# Patient Record
Sex: FEMALE | Race: WHITE
[De-identification: names, ages, dates, MRNs, and addresses within clinical notes are randomized per-mention and may not be internally consistent; named-entity substitution may affect disease eponyms.]

---

## 2022-09-20 ENCOUNTER — HOSPITAL ENCOUNTER (OUTPATIENT)
Dept: HOSPITAL 76 - LAB | Age: 27
Discharge: HOME | End: 2022-09-20
Attending: ADVANCED PRACTICE MIDWIFE
Payer: COMMERCIAL

## 2022-09-20 DIAGNOSIS — R33.8: Primary | ICD-10-CM

## 2022-09-20 PROCEDURE — 36415 COLL VENOUS BLD VENIPUNCTURE: CPT

## 2022-09-20 PROCEDURE — 85049 AUTOMATED PLATELET COUNT: CPT

## 2022-10-15 ENCOUNTER — HOSPITAL ENCOUNTER (OUTPATIENT)
Dept: HOSPITAL 76 - WFO | Age: 27
Discharge: HOME | End: 2022-10-15
Attending: ADVANCED PRACTICE MIDWIFE
Payer: COMMERCIAL

## 2022-10-15 VITALS — DIASTOLIC BLOOD PRESSURE: 69 MMHG | SYSTOLIC BLOOD PRESSURE: 109 MMHG

## 2022-10-15 DIAGNOSIS — O99.113: ICD-10-CM

## 2022-10-15 DIAGNOSIS — O99.891: Primary | ICD-10-CM

## 2022-10-15 DIAGNOSIS — D69.6: ICD-10-CM

## 2022-10-15 DIAGNOSIS — Z3A.36: ICD-10-CM

## 2022-10-15 DIAGNOSIS — R10.9: ICD-10-CM

## 2022-10-15 LAB
ALBUMIN DIAFP-MCNC: 3.1 G/DL (ref 3.2–5.5)
ALBUMIN/GLOB SERPL: 1 {RATIO} (ref 1–2.2)
ALP SERPL-CCNC: 107 IU/L (ref 42–121)
ALT SERPL W P-5'-P-CCNC: 19 IU/L (ref 10–60)
ANION GAP SERPL CALCULATED.4IONS-SCNC: 7 MMOL/L (ref 6–13)
AST SERPL W P-5'-P-CCNC: 18 IU/L (ref 10–42)
BASOPHILS NFR BLD AUTO: 0 10^3/UL (ref 0–0.1)
BASOPHILS NFR BLD AUTO: 0.1 %
BILIRUB BLD-MCNC: 0.6 MG/DL (ref 0.2–1)
BUN SERPL-MCNC: 8 MG/DL (ref 6–20)
CALCIUM UR-MCNC: 9 MG/DL (ref 8.5–10.3)
CHLORIDE SERPL-SCNC: 105 MMOL/L (ref 101–111)
CLARITY UR REFRACT.AUTO: CLEAR
CO2 SERPL-SCNC: 25 MMOL/L (ref 21–32)
CREAT SERPLBLD-SCNC: 0.7 MG/DL (ref 0.4–1)
EOSINOPHIL # BLD AUTO: 0 10^3/UL (ref 0–0.7)
EOSINOPHIL NFR BLD AUTO: 0.5 %
ERYTHROCYTE [DISTWIDTH] IN BLOOD BY AUTOMATED COUNT: 12.7 % (ref 12–15)
GFRSERPLBLD MDRD-ARVRAT: 102 ML/MIN/{1.73_M2} (ref 89–?)
GLOBULIN SER-MCNC: 3.2 G/DL (ref 2.1–4.2)
GLUCOSE SERPL-MCNC: 112 MG/DL (ref 70–100)
GLUCOSE UR QL STRIP.AUTO: NEGATIVE MG/DL
HCT VFR BLD AUTO: 39.6 % (ref 37–47)
HGB UR QL STRIP: 13 G/DL (ref 12–16)
KETONES UR QL STRIP.AUTO: NEGATIVE MG/DL
LYMPHOCYTES # SPEC AUTO: 1 10^3/UL (ref 1.5–3.5)
LYMPHOCYTES NFR BLD AUTO: 11 %
MCH RBC QN AUTO: 30.7 PG (ref 27–31)
MCHC RBC AUTO-ENTMCNC: 32.8 G/DL (ref 32–36)
MCV RBC AUTO: 93.4 FL (ref 81–99)
MONOCYTES # BLD AUTO: 0.5 10^3/UL (ref 0–1)
MONOCYTES NFR BLD AUTO: 6.1 %
NEUTROPHILS # BLD AUTO: 7.2 10^3/UL (ref 1.5–6.6)
NEUTROPHILS # SNV AUTO: 8.9 X10^3/UL (ref 4.8–10.8)
NEUTROPHILS NFR BLD AUTO: 81.7 %
NITRITE UR QL STRIP.AUTO: NEGATIVE
NRBC # BLD AUTO: 0 /100WBC
NRBC # BLD AUTO: 0 X10^3/UL
PDW BLD AUTO: 12.6 FL (ref 7.9–10.8)
PH UR STRIP.AUTO: 7 PH (ref 5–7.5)
PLATELET # BLD: 123 10^3/UL (ref 130–450)
POTASSIUM SERPL-SCNC: 3.7 MMOL/L (ref 3.5–5)
PROT SPEC-MCNC: 6.3 G/DL (ref 6.7–8.2)
PROT UR STRIP.AUTO-MCNC: NEGATIVE MG/DL
RBC # UR STRIP.AUTO: NEGATIVE /UL
RBC # URNS HPF: (no result) /HPF (ref 0–5)
RBC MAR: 4.24 10^6/UL (ref 4.2–5.4)
SODIUM SERPLBLD-SCNC: 137 MMOL/L (ref 135–145)
SP GR UR STRIP.AUTO: 1.01 (ref 1–1.03)
SQUAMOUS URNS QL MICRO: (no result)
UROBILINOGEN UR QL STRIP.AUTO: (no result) E.U./DL
UROBILINOGEN UR STRIP.AUTO-MCNC: NEGATIVE MG/DL
WBC # UR MANUAL: (no result) /HPF (ref 0–5)

## 2022-10-15 PROCEDURE — 81001 URINALYSIS AUTO W/SCOPE: CPT

## 2022-10-15 PROCEDURE — 85025 COMPLETE CBC W/AUTO DIFF WBC: CPT

## 2022-10-15 PROCEDURE — 80053 COMPREHEN METABOLIC PANEL: CPT

## 2022-10-15 PROCEDURE — 36415 COLL VENOUS BLD VENIPUNCTURE: CPT

## 2022-10-15 PROCEDURE — 99215 OFFICE O/P EST HI 40 MIN: CPT

## 2022-10-15 PROCEDURE — 59025 FETAL NON-STRESS TEST: CPT

## 2022-10-15 NOTE — PROVIDER PROGRESS NOTE
- HPI


Chief Complaint: Pain, non-labor


Current Pregnancy: 


Vital Signs











Temperature  36.6 C   10/15/22 15:20


 


Heart Rate  93   10/15/22 15:20


 


Respiratory Rate  17   10/15/22 15:20


 


Blood Pressure  109/69   10/15/22 15:20


 


O2 Saturation  100   10/15/22 15:20




















Temperature  36.6 C   10/15/22 15:21


 


Heart Rate  93   10/15/22 15:20


 


Respiratory Rate  17   10/15/22 15:20


 


Blood Pressure  109/69   10/15/22 15:20


 


O2 Saturation  100   10/15/22 15:20


 


If not protocol: Oxygen Flow, liters/minute      














- Procedures


OB Procedure Performed: NST


Diagnosis/Indication for NST:  labor





- Plan


Plan: 





HPI: Tawana is a 26yo  @ 36.3wks gestation who presents to [facility] with 

concerns for upper abdominal pain that is sharp, wave-like and has been present 

intermittently since early this morning at 0330am. She denies vaginal bleeding 

or leakage of fluid. She reports +FM. She states she can feel contractions 

occasionally that just seem like [place] contractions and the pain she is 

experiencing currently is not the same. She rates the pain 8/10. She has taken 

TUMS, eaten fresh pineapple, rested, taken a warm bath, and was able to get a 

little relief for a couple of hours this morning but then it came back today 

around 1130 and seemed more severe. She denies fever, body aches, or chills. She

denies constipation or diarrhea. She reports mild headache but states she gets 

them occasionally because she does not wear her glasses when she should. She 

states she feels adequately hydrated and she denies urinary symptoms.





NST performed 10/15/2022


NST read 10/15/2022


NST reactive. FHR baseline 130, moderate variability, + accels, no decels


One contraction appreciated via tocometry which pt appreciates as mild. Soft 

resting tone.





SVE 1/50/high, vertex.





CBC WNL ( secondary to gestational thrombocytopenia - stable from last 

draw 2 weeks ago)


CMP WNL


 Vital signs WNL.





Normocephalic, atraumatic, heart RRR w/o M/G/R, lungs CTAB, abdomen gravid soft 

and nontender, trace edema to LE's bilaterally. Mood is good.





Assessment:


26yo  @ 36.3wks gestation


Gestational thombocytopenia


Mid upper abdominal pain -  labor and pre-eclamspia ruled out. Suspect 

gas pains.





Plan:


Pt released home with precautions.


Recommended simethicone PRN pain and continuation of TUMS PRN.


Reviewed warning s/sx and when to present.


Pt verbalized understanding and agrees to above plan. She denies further 

questions or concerns at this time.





FINAL DIAGNOSIS:


Abdominal pain

## 2022-11-05 ENCOUNTER — HOSPITAL ENCOUNTER (INPATIENT)
Dept: HOSPITAL 76 - WFO | Age: 27
LOS: 2 days | Discharge: HOME | End: 2022-11-07
Attending: ADVANCED PRACTICE MIDWIFE | Admitting: ADVANCED PRACTICE MIDWIFE
Payer: COMMERCIAL

## 2022-11-05 DIAGNOSIS — D69.6: ICD-10-CM

## 2022-11-05 DIAGNOSIS — Z3A.39: ICD-10-CM

## 2022-11-05 LAB
BASOPHILS NFR BLD AUTO: 0 10^3/UL (ref 0–0.1)
BASOPHILS NFR BLD AUTO: 0.2 %
EOSINOPHIL # BLD AUTO: 0 10^3/UL (ref 0–0.7)
EOSINOPHIL NFR BLD AUTO: 0.2 %
ERYTHROCYTE [DISTWIDTH] IN BLOOD BY AUTOMATED COUNT: 12.8 % (ref 12–15)
HCT VFR BLD AUTO: 38.9 % (ref 37–47)
HGB UR QL STRIP: 13.1 G/DL (ref 12–16)
IGF BP1 VAG QL: NEGATIVE
LYMPHOCYTES # SPEC AUTO: 0.9 10^3/UL (ref 1.5–3.5)
LYMPHOCYTES NFR BLD AUTO: 6.9 %
MCH RBC QN AUTO: 31.4 PG (ref 27–31)
MCHC RBC AUTO-ENTMCNC: 33.7 G/DL (ref 32–36)
MCV RBC AUTO: 93.3 FL (ref 81–99)
MONOCYTES # BLD AUTO: 0.7 10^3/UL (ref 0–1)
MONOCYTES NFR BLD AUTO: 5 %
NEUTROPHILS # BLD AUTO: 11.3 10^3/UL (ref 1.5–6.6)
NEUTROPHILS # SNV AUTO: 12.9 X10^3/UL (ref 4.8–10.8)
NEUTROPHILS NFR BLD AUTO: 87.2 %
NRBC # BLD AUTO: 0 /100WBC
NRBC # BLD AUTO: 0 X10^3/UL
PDW BLD AUTO: 13.5 FL (ref 7.9–10.8)
PLATELET # BLD: 140 10^3/UL (ref 130–450)
RBC MAR: 4.17 10^6/UL (ref 4.2–5.4)

## 2022-11-05 PROCEDURE — 86901 BLOOD TYPING SEROLOGIC RH(D): CPT

## 2022-11-05 PROCEDURE — 86900 BLOOD TYPING SEROLOGIC ABO: CPT

## 2022-11-05 PROCEDURE — 85025 COMPLETE CBC W/AUTO DIFF WBC: CPT

## 2022-11-05 PROCEDURE — 99215 OFFICE O/P EST HI 40 MIN: CPT

## 2022-11-05 PROCEDURE — 84112 EVAL AMNIOTIC FLUID PROTEIN: CPT

## 2022-11-05 PROCEDURE — 86850 RBC ANTIBODY SCREEN: CPT

## 2022-11-05 PROCEDURE — 99213 OFFICE O/P EST LOW 20 MIN: CPT

## 2022-11-05 PROCEDURE — 10907ZC DRAINAGE OF AMNIOTIC FLUID, THERAPEUTIC FROM PRODUCTS OF CONCEPTION, VIA NATURAL OR ARTIFICIAL OPENING: ICD-10-PCS | Performed by: ADVANCED PRACTICE MIDWIFE

## 2022-11-05 RX ADMIN — SODIUM CHLORIDE SCH MLS/HR: 900 INJECTION INTRAVENOUS at 17:55

## 2022-11-05 RX ADMIN — SODIUM CHLORIDE SCH MLS/HR: 900 INJECTION INTRAVENOUS at 22:05

## 2022-11-05 NOTE — ANESTHESIA PROCEDURE NOTE
Anesthesia Epidural Template





- Patient Report


Patient Reports: positive: Pain controlled





- Plan


Plan: positive: Continue current management

## 2022-11-05 NOTE — ANESTHESIA
Pre-Anesthesia VS, & Labs





- Diagnosis





active labor





- Procedure





labor epidural


Vital Signs: 





                                        











Temp Pulse Resp BP Pulse Ox O2 Flow Rate


 


 36.8 C   87   18   124/81 H      


 


 22 17:04  22 17:04  22 12:38  22 12:38      











Height: 5 ft 4 in


Weight (kg): 71.668 kg


Body Mass Index: 27.1


BMI Classification: Overweight





- NPO


Other (eating and drinking until epidural)





- Pregnancy


Is Patient Pregnant?: Yes





- Lab Results


Current Lab Results: 





Laboratory Tests





22 15:21: Blood Type Recheck A POSITIVE


22 12:50: WBC 12.9 H, RBC 4.17 L, Hgb 13.1, Hct 38.9, MCV 93.3, MCH 31.4 H

, MCHC 33.7, RDW 12.8, Plt Count 140, MPV 13.5 H, Neut # (Auto) 11.3 H, Lymph # 

(Auto) 0.9 L, Mono # (Auto) 0.7, Eos # (Auto) 0.0, Baso # (Auto) 0.0, Absolute 

Nucleated RBC 0.00, Nucleated RBC % 0.0


22 12:50: Blood Type A POSITIVE, Antibody Screen NEGATIVE








Fish Bones: 


                                 22 12:50








Home Medications and Allergies





Active Medications





Carboprost Tromethamine (Carboprost Tromethamine 250 Mcg/Ml Amp)  250 mcg IM 

.ONCE PRN


   PRN Reason: Hemorrhage


Fentanyl (Fentanyl 100 Mcg/2 Ml Vial)  50 mcg IVP Q1H PRN


   PRN Reason: Severe Pain (score 7-10)


Hydralazine HCl (Hydralazine Inj 20 Mg/Ml Vial)  5 - 10 mg IVP Q20M PRN; 

Protocol


   PRN Reason: SBP> or= 160 OR DBP> or= 110


Hydralazine HCl (Hydralazine Inj 20 Mg/Ml Vial)  10 mg IVP .ONCE PRN; Protocol


   PRN Reason: SBP> or= 160 OR DBP> or= 110


Oxytocin/Sodium Chloride (Pitocin/Sodium Chloride)  500 mls @ 999 mls/hr IV PRN 

PRN; Protocol


   PRN Reason: POST-PARTUM HEMORR PREVENTION


Tranexamic Acid (Tranexamic 1,000 Mg/100ml-Nacl)  1,000 mg in 100 mls @ 600 

mls/hr IV Q30M PRN


   PRN Reason: EBL >1200mL and within 3hr


Ampicillin Sodium 1 gm/ Sodium (Chloride)  100 mls @ 200 mls/hr IV Q4H [facility]


   Last Infusion: 22 18:25 Dose:  Infused


   


Lactated Ringer's (Lr)  1,000 mls @ 125 mls/hr IV .Q8H [facility]


   Last Admin: 22 20:00 Dose:  125 mls/hr


   


Labetalol HCl (Labetalol 20 Mg/4 Ml Syringe)  20 - 80 mg IVP Q10M PRN; Protocol


   PRN Reason: SBP> or= 160 OR DBP> or= 110


Labetalol HCl (Labetalol 20 Mg/4 Ml Syringe)  20 mg IVP .ONCE PRN; Protocol


   PRN Reason: SBP> or= 160 OR DBP> or= 110


Labetalol HCl (Labetalol 20 Mg/4 Ml Syringe)  20 - 40 mg IVP Q10M PRN; Protocol


   PRN Reason: SBP> or= 160 OR DBP> or= 110


Lidocaine HCl (Lidocaine 1% 20 Ml Mdv)  20 ml ID .ONCE PRN


   PRN Reason: PERINEAL REPAIR


   Stop: 22 13:18


Methylergonovine Maleate (Methylergonovine 0.2 Mg/Ml Vial)  0.2 mg IM .ONCE PRN


   PRN Reason: Hemorrhage


Misoprostol (Misoprostol 200 Mcg Tablet)  600 mcg BC .ONCE PRN


   PRN Reason: Hemorrhage


Misoprostol (Misoprostol 200 Mcg Tablet)  800 mcg TX .ONCE PRN


   PRN Reason: Hemorrhage


Nifedipine (Nifedipine 10 Mg Capsule)  10 - 20 mg PO Q20M PRN; Protocol


   PRN Reason: SBP> or= 160 OR DBP> or= 110


Oxytocin (Oxytocin 10 Unit/Ml Vial)  10 unit IM .ONCE PRN


   PRN Reason: Step One if no IV access.


Sodium Chloride (Sodium Chloride Flush 0.9% 10 Ml Syringe)  10 ml IVP PRN PRN


   PRN Reason: AS NEEDED PER PROVIDER ORDERS


Sodium Chloride (Sodium Chloride Flush 0.9% 10 Ml Syringe)  10 ml IVP Q8H [facility]


   Last Admin: 22 17:56 Dose:  10 ml


   


Terbutaline Sulfate (Terbutaline 1 Mg/Ml Vial)  0.25 mg SUBQ .ONCE PRN


   PRN Reason: Tachystole








Allergies/Adverse Reactions: 


                                    Allergies











Allergy/AdvReac Type Severity Reaction Status Date / Time


 


Pertussis Vaccines AdvReac  Anaphylaxis Verified 22 12:57














Anes History & Medical History





- Anesthetic History


Anesthesia Complications: reports: No previous complications


Family history of Anesthesia Complications: Denies


Family history of Malignant Hyperthermia: Denies





- Medical History


Cardiovascular: reports: None (gestational thrombocytopenia)


Smoking Status: Never smoker





- Obstetrical History


: 2


Parity: 1


Prenatal Events: reports: None


Pregnancy Complications: reports: None





Exam


General: Alert, Oriented x3, Cooperative


Dental: WNL


Mouth Opening: 3 Fingerbreadth


Neck Mobility: Normal


Mallampati classification: I


Thyromental Distance: 4-6 cm


Respiratory: Lungs clear


Cardiovascular: Regular rate





Plan


Anesthesia Type: Epidural


Consent for Procedure(s) Verified and Reviewed: Yes


Code Status: Attempt Resuscitation


ASA classification: 2-Mild systemic disease


Is this case an emergency?: No

## 2022-11-05 NOTE — PROCEDURE REPORT
- HPI


Diagnosis/Indication for NST: Other





- NST Procedure


NST Procedure





Start Time                       15:17


Stop Time                        16:15











- Results and Plan


Plan: 





Tawana presents with c/o contractions. She reports contractions have increased 

in frequency and intensity since early this morning. She lost her mucus plug 

this morning. She denies vaginal bleeding but did experience some pink discharge

when she went to the bathroom last. She does report some leakage of clear fluid 

this morning and again just now after using the bathroom. She reports +FM. She 

states the majority of her contractions are in her back but does occasionally 

feel them in her front as well.





NST performed 11/05/2022


NST read 11/05/2022


NST reactive. FHR baseline 140s, moderate variability, + accels, no decels


Contractions palpate moderate every 5-8 minutes with soft resting tone





SVE 3/90/-1, posterior and vertex.


ROM plus - negative





Pt admitted for expectant management.


She is GBS positive and had difficulty with transportation secondary to poor 

road conditions.


Pt verbalized understanding and agrees to above plan. She denies further 

questions or concerns at this time.
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
## 2022-11-05 NOTE — PROVIDER PROGRESS NOTE
Labor Progress Note





- Uterine Monitoring


Uterine Monitoring Mode: positive: External toco


Contraction Frequency (min/apart): 5-7


Contraction Intensity: positive: Moderate


Uterine Resting Tone: positive: Soft





- Fetal Monitoring


Fetal Monitor Mode: positive: External ultrasound


Fetal Heart Rate Baseline: 130


Fetal Heart Rate Variability: positive: Moderate (6-25 bmp)


Fetal Accelerations: positive: Present, 15x15


Fetal Decelerations: positive: None


Fetal Strip Review: positive: Category I





- Vaginal Exam


Dilation (in cm): 4


Effacement (%): 90


Station: -1


Cervical Position: Posterior





- Labor Progress Note


Labor Progress Note/Additional Text: 





S: Breathing through contractions. Feeling the contractions are getting longer 

but remaining about the same in intensity. She is coping well and her mom is 

supportive at the bedside.


O: FHR baseline 130s, moderate variability, + accels, no decels


Contractions palpate moderate every 5-7 minutes with soft resting tone


SVE 4/90/-1, posterior. Vertex.


AROM moderate amount of clear fluid


A: 28yo  @ 39.3wks gestation by LMP c/w 8.6wk U/S


Active labor


GBS positive


FHR Category I


P: Continue expectant management.


Intermittent fetal heart rate auscultation.


Jacuzzi PRN. Nitrous oxide PRN.


Epidural per maternal request.


Anticipate .

## 2022-11-06 RX ADMIN — ACETAMINOPHEN SCH MG: 500 TABLET ORAL at 21:07

## 2022-11-06 RX ADMIN — IBUPROFEN SCH MG: 800 TABLET, FILM COATED ORAL at 14:03

## 2022-11-06 RX ADMIN — DOCUSATE SODIUM SCH MG: 100 CAPSULE, LIQUID FILLED ORAL at 21:07

## 2022-11-06 RX ADMIN — IBUPROFEN SCH MG: 800 TABLET, FILM COATED ORAL at 21:07

## 2022-11-06 RX ADMIN — ACETAMINOPHEN SCH MG: 500 TABLET ORAL at 16:55

## 2022-11-06 RX ADMIN — IBUPROFEN SCH MG: 800 TABLET, FILM COATED ORAL at 08:01

## 2022-11-06 RX ADMIN — ACETAMINOPHEN SCH MG: 500 TABLET ORAL at 01:03

## 2022-11-06 RX ADMIN — ACETAMINOPHEN SCH MG: 500 TABLET ORAL at 09:13

## 2022-11-06 RX ADMIN — IBUPROFEN SCH MG: 800 TABLET, FILM COATED ORAL at 01:03

## 2022-11-06 NOTE — DELIVERY NOTE
Delivery Note





- Labor


Labor: positive: Augmented by ARM





- Infant Delivery Method


Infant Delivery Method: positive: Spontaneous vaginal delivery





- Birth Presentation


Birth Presentation: positive: Vertex, SAMINA - right occiput anterior





- Nuchal Cord


Nuchal Cord: positive: None





- Amniotic Fluid Description


Amniotic Fluid Description: positive: Clear





- Episiotomy Type


Episiotomy Type: positive: None





- Laceration


Laceration: positive: None





- Delivery Outcome


Delivery Outcome: positive: Livebirth





- 


[place]: positive: Placed in direct skin contact with mother, Stimulated, 

Warmed, [place] used


 sex: positive: Female





- Cord


Cord: positive: 3 vessels





- Placenta


Placenta: positive: Intact, Spontaneous





- Estimated Blood Loss


Estimated Blood Loss (in cc): 300





- Post Delivery Events


Post Delivery Events: positive: No post delivery events





- Delivery Comments (Free Text/Narrative)


Delivery Comments (Free Text/Narrative): 





Labor: This 26yo  @ 39.4wks gestation by LMP c/w first trimester 

ultrasound presented on 2022 with c/o contractions. Upon arrival she was 

noted to be 3/90/-1, posterior and vertex with intact membranes. She was found 

to contraction every 5-8 minutes with soft resting tone. She was admitted for 

expectant management. She was augmented with AROM at 1830 on 2022 for a 

moderate amount of clear fluid. Epidural placed per maternal request. She 

received adequate GBS prophylaxis and is s/p 3 doses of ampicillin per protocol.

She progressed spontaneously to c/c/+2 and pushing at 2358.


Birth: Normal  of viable male infant on 2022 @ 0005. No nuchal cord. 

Apgars were 8/9 at 1 and 5 minutes respectively.  stimulated, dried, and 

placed skin to skin. The umbilical cord was allowed to stop pulsating at which 

time it was doubly clamped by CNM and cut by the patient. Pitocin initiated for 

hemostatis. Gentle cord traction and fundal massage applied for active 

management of the third stage. Placenta delivered spontaneously and intact at 

0011. 3VC. Cord blood was obtained. EBL 300mL.


Fourth stage: Uterine fundus firm and there is no excessive bleeding. The 

perineum, cervix and vagina were inspected and noted to be intact. Breastfeeding

initiated. Family bonding well. Both mother and baby were left in stable 

condition.

## 2022-11-07 VITALS — SYSTOLIC BLOOD PRESSURE: 121 MMHG | DIASTOLIC BLOOD PRESSURE: 69 MMHG

## 2022-11-07 RX ADMIN — IBUPROFEN SCH MG: 800 TABLET, FILM COATED ORAL at 02:57

## 2022-11-07 RX ADMIN — ACETAMINOPHEN SCH MG: 500 TABLET ORAL at 13:02

## 2022-11-07 RX ADMIN — ACETAMINOPHEN SCH MG: 500 TABLET ORAL at 05:24

## 2022-11-07 RX ADMIN — DOCUSATE SODIUM SCH MG: 100 CAPSULE, LIQUID FILLED ORAL at 08:09

## 2022-11-07 RX ADMIN — IBUPROFEN SCH MG: 800 TABLET, FILM COATED ORAL at 08:09

## 2022-11-07 NOTE — DISCHARGE SUMMARY
Discharge Summary


Condition at Discharge: Good


Discharge Disposition: 01 Home, Self Care





- HOSPITAL COURSE


Hospital Course: 





Date of Admission: 2022





Date of Discharge: 2022





Diagnosis on Admission:


1. 26yo  @ 39.0wks gestation


2. Active labor


3. Gestational thrombocytopenia


4. GBS negative


5. FHR Category I





Diagnosis on Discharge:


1. 26yo  PPD#1 s/p TSVD viable male infant


2. Intact perineum


3. Normal recovery





Brief history: She is a patient of [facility] who presented on 

2022 with c/o contractions. She was noted to contract ever 3-5 minutes and

her cervix was 4/90/-1 and vertex with intact membranes. AROM for labor 

augmentation for a moderate amount of clear fluid. Epidural placed per maternal 

request. Pt progressed to spontaneously deliver a viable male infant on 

2022 at 0005. EBL 300mL. Apgars were 8/9 at 1 and 5 minutes respectively. 

Perineum intact.


She has been doing well in her postpartum course. She is ambulating and 

tolerating a regular diet. She is urinating without difficulty and her lochia is

normal. Her pain is well controlled with oral medications. She is bonding well 

with her baby and breastfeeding without difficulty. She will be discharged home 

today on postpartum day #1 with instructions to continue taking her prenatal 

vitamin while breastfeeding and to continue taking ibuprofen and tylenol over 

the counter as needed for pain management. She intends to follow up with myself 

at [facility] in [place] in 1 week and again at 6 weeks or 

sooner if needed. She has been given precautions to call if she has any 

worsening fevers, chills, abdominal pain, increased vaginal bleeding or foul 

smelling vaginal lochia.





Physical exam:


Normocephalic, atraumatic. Heart RRR w/o M/G/R, lungs CTAB, abdomen soft and 

nontender with fundus firm at U-1. Perineum intact, light lochia rubra. 

Bilateral LE's no edema. Mood is good.





- ALLERGIES


Allergies/Adverse Reactions: 


                                    Allergies











Allergy/AdvReac Type Severity Reaction Status Date / Time


 


Pertussis Vaccines AdvReac  Anaphylaxis Verified 22 12:57














- LABS


Result Diagrams: 


                                 22 12:50

## 2022-11-07 NOTE — LABOR FLOWSHEET
===================================

Labor Flowsheet

===================================

Datetime Report Generated by CPN: 2022 13:38

   

   

===========================

Datetime: 2022 00:27

===========================

   

   

===================================

VITAL SIGNS

===================================

   

 NBP Sys/Kim/Mean (mmHg):  118

:  68

:  80

 Pulse:  75

   

===========================

Datetime: 2022 00:33

===========================

   

 Respirations:  22

   

===========================

Datetime: 2022 00:15

===========================

   

   

===================================

MEDICATIONS

===================================

   

 Pitocin (milliunits):  Started @ (Annotations: Placenta has been delivered; pitocin bolus infusing)

   

===========================

Datetime: 2022 00:08

===========================

   

 Membranes Ruptured Date/Time:  2022 18:30

 Cervical Ripening Agents Other:  none

   

===========================

Datetime: 2022 00:03

===========================

   

 Pushing Progress:   with Pushing; Pushing Effectively with Contractions

   

===========================

Datetime: 2022 00:01

===========================

   

   

===================================

FETAL ASSESSMENT A

===================================

   

 Monitor Mode:  Telemetry

 FHR Baseline Rate :  110

 Variability:  Moderate 6-25 bpm

 Accelerations:  10X10

 Decelerations:  Variable

 Category:  Category II

 Comments:  Pushing

 Pushing Position:  Pushing with Contractions

 LaborFlag:  Labor

   

===========================

Datetime: 2022 23:58

===========================

   

   

===================================

VAGINAL EXAM

===================================

   

 Dilatation (cm):  10.0

 Effacement (%):  100

 Station:  2

 Exam by:  BOB Chahal

      

   

===================================

TEACHING

===================================

   

 Instructional Method:  Demo; Patient Instructed; Family/Support Person Instructed; Verbalized Unders
tanding

 Plan of Care:  Plan of Care Discussed; Vaginal Delivery

   

===================================

STAGE 2

===================================

   

 Pushing:  Coached on Pushing; No Urge to Push

   

===========================

Datetime: 2022 23:56

===========================

   

Stage of Pregnancy:  Labor

 I/O Interventions:  Dillon Discontinued (Annotations: 500ml urine in dillon; dillon removed)

 Provider Reviewed Strip:  Yes

 Strip Reviewed by:  BOB Chahal

   

===================================

COMMUNICATION

===================================

   

 Communication:  Provider at Bedside

 Provider Notified (Name):  Fela

 Notification Reason:  Status Update; Fetal Status; Labor Status

 Communication Comments:  Provider here for delivery

   

===========================

Datetime: 2022 23:50

===========================

   

 Actions for Fetal Decelerations:  Side to Side

   

===================================

PATIENT CARE

===================================

   

 Patient Position/Activity:  HOB Lowered; Left Lateral

   

===========================

Datetime: 2022 23:45

===========================

   

   

===================================

UTERINE ACTIVITY

===================================

   

 Monitor Mode:  External

 Monitor Interventions for UA:  [place] Adjusted

 Frequency (min):  2-3.5

 Quality:  Strong

 Duration (sec):  60-80

 Pattern:  Normal: <= 5 Contractions in 10 Minutes

 Resting Tone (Palpate):  Relaxed

 FHR Baseline Changes:  No Baseline Change

   

===================================

PAIN

===================================

   

 Pain Scale:  0

 Pain Presence:  None/Denies

 Pain Type:  N/A

 Pain Goal:  4

 Pain Relief Measures:  Epidural Given

 Pain Coping:  Talking Through Contractions

 Comfort Measures:  Family Support

   

===========================

Datetime: 2022 23:35

===========================

   

 Vaginal Bleeding:  Normal Show

 Cervix, Consistency:  Soft

 Cervix, Position:  Anterior

   

===========================

Datetime: 2022 23:30

===========================

   

   

===================================

FETAL ASSESSMENT B

===================================

   

 FHR Baseline Changes:  No Baseline Change

 Variability:  Moderate 6-25 bpm

 Accelerations:  15X15

 Decelerations:  None

 Category:  Category I

   

===========================

Datetime: 2022 23:28

===========================

   

 Temperature (C):  36.0

   

===========================

Datetime: 2022 23:16

===========================

   

 Anesthesia Level Check:  T6- Xyphoid

 Anesthesia Comments:  Comfortable with epidural

   

===========================

Datetime: 2022 21:35

===========================

   

 Temperature Route:  Oral

   

===========================

Datetime: 2022 20:07

===========================

   

 Epidural Procedure Other:  Pump Started

   

===========================

Datetime: 2022 20:05

===========================

   

 Epidural Positioning:  Side Lying

   

===========================

Datetime: 2022 19:58

===========================

   

 Epidural Procedure:  Test Dose

   

===========================

Datetime: 2022 19:52

===========================

   

   

===================================

PROCEDURE TIME OUT

===================================

   

 Procedure Verify:  Correct Patient Identity; Accurate Procedure Consent Form; Agreement on Procedure
 to be Done; Correct Patient Position

   

===========================

Datetime: 2022 19:49

===========================

   

 SpO2 (%):  99

   

===========================

Datetime: 2022 19:47

===========================

   

 Pain Location:  Abdomen

   

===========================

Datetime: 2022 19:44

===========================

   

   

===================================

ANESTHESIA

===================================

   

 Anesthesia Plans:  Epidural

   

===========================

Datetime: 2022 19:00

===========================

   

 Pain Assessment Comments:  Nitrous set up and instructed in use.

   

===========================

Datetime: 2022 18:32

===========================

   

 Membrane Status:  Ruptured

 Membranes Rupture Method:  Artificial

 Amniotic Fluid Color:  Clear

 Amniotic Fluid Amount:  Moderate

 Amniotic Fluid Odor:  Normal

   

===========================

Datetime: 2022 16:30

===========================

   

 Contraction Comments:  breathing through some contractions

   

===========================

Datetime: 2022 14:19

===========================

   

 Antibiotics:  Ampicillin IV 2 Gm

## 2022-11-07 NOTE — DISCHARGE PLAN
Discharge Plan


Problem Reviewed?: Yes


Disposition: 01 Home, Self Care


Condition: Good


Diet: Regular


Activity Restrictions: No Restrictions


Shower Restrictions: No


Driving Restrictions: No


Weight Bearing: Full Weight


No Smoking: If you smoke, Please STOP!  Call [phone] for help.


Follow-up with: 


Susie Chahal CNM, ARNP [Provider Admit Priv/Credential] - 1 Week (phone visit 

scheduled 11/16/22 @ 11:45am)